# Patient Record
Sex: FEMALE | Race: WHITE | Employment: FULL TIME | URBAN - METROPOLITAN AREA
[De-identification: names, ages, dates, MRNs, and addresses within clinical notes are randomized per-mention and may not be internally consistent; named-entity substitution may affect disease eponyms.]

---

## 2024-03-13 ENCOUNTER — OFFICE VISIT (OUTPATIENT)
Dept: PAIN MANAGEMENT | Age: 39
End: 2024-03-13
Payer: COMMERCIAL

## 2024-03-13 ENCOUNTER — APPOINTMENT (OUTPATIENT)
Dept: GENERAL RADIOLOGY | Age: 39
End: 2024-03-13
Payer: COMMERCIAL

## 2024-03-13 ENCOUNTER — HOSPITAL ENCOUNTER (EMERGENCY)
Age: 39
Discharge: HOME | End: 2024-03-13
Payer: COMMERCIAL

## 2024-03-13 VITALS
OXYGEN SATURATION: 100 % | HEART RATE: 98 BPM | TEMPERATURE: 97.6 F | RESPIRATION RATE: 18 BRPM | SYSTOLIC BLOOD PRESSURE: 141 MMHG | DIASTOLIC BLOOD PRESSURE: 80 MMHG

## 2024-03-13 VITALS
HEART RATE: 102 BPM | TEMPERATURE: 97.3 F | OXYGEN SATURATION: 98 % | RESPIRATION RATE: 18 BRPM | SYSTOLIC BLOOD PRESSURE: 120 MMHG | HEIGHT: 65 IN | BODY MASS INDEX: 24.16 KG/M2 | DIASTOLIC BLOOD PRESSURE: 62 MMHG | WEIGHT: 145 LBS

## 2024-03-13 DIAGNOSIS — G89.4 CHRONIC PAIN SYNDROME: Primary | ICD-10-CM

## 2024-03-13 DIAGNOSIS — S12.500D: ICD-10-CM

## 2024-03-13 DIAGNOSIS — S46.911A STRAIN OF RIGHT SHOULDER, INITIAL ENCOUNTER: Primary | ICD-10-CM

## 2024-03-13 DIAGNOSIS — S92.101D CLOSED DISPLACED FRACTURE OF RIGHT TALUS WITH ROUTINE HEALING, UNSPECIFIED FRACTURE MORPHOLOGY, SUBSEQUENT ENCOUNTER: ICD-10-CM

## 2024-03-13 DIAGNOSIS — M79.18 MYOFASCIAL PAIN SYNDROME: ICD-10-CM

## 2024-03-13 DIAGNOSIS — S22.43XD MULTIPLE CLOSED FRACTURES OF RIBS OF BOTH SIDES WITH ROUTINE HEALING, SUBSEQUENT ENCOUNTER: ICD-10-CM

## 2024-03-13 DIAGNOSIS — G58.8 INTERCOSTAL NEURALGIA: ICD-10-CM

## 2024-03-13 PROCEDURE — 99213 OFFICE O/P EST LOW 20 MIN: CPT | Performed by: PAIN MEDICINE

## 2024-03-13 PROCEDURE — G8427 DOCREV CUR MEDS BY ELIG CLIN: HCPCS | Performed by: PAIN MEDICINE

## 2024-03-13 PROCEDURE — 73030 X-RAY EXAM OF SHOULDER: CPT

## 2024-03-13 PROCEDURE — G8420 CALC BMI NORM PARAMETERS: HCPCS | Performed by: PAIN MEDICINE

## 2024-03-13 PROCEDURE — 99283 EMERGENCY DEPT VISIT LOW MDM: CPT

## 2024-03-13 PROCEDURE — G8484 FLU IMMUNIZE NO ADMIN: HCPCS | Performed by: PAIN MEDICINE

## 2024-03-13 PROCEDURE — 4004F PT TOBACCO SCREEN RCVD TLK: CPT | Performed by: PAIN MEDICINE

## 2024-03-13 RX ORDER — PREDNISONE 10 MG/1
TABLET ORAL
Qty: 30 TABLET | Refills: 0 | Status: SHIPPED | OUTPATIENT
Start: 2024-03-13 | End: 2024-03-23

## 2024-03-13 RX ORDER — CYCLOBENZAPRINE HCL 5 MG
TABLET ORAL
Qty: 60 TABLET | Refills: 2 | Status: SHIPPED | OUTPATIENT
Start: 2024-03-13

## 2024-03-13 RX ORDER — IBUPROFEN 800 MG/1
TABLET ORAL
Qty: 60 TABLET | Refills: 2 | Status: SHIPPED | OUTPATIENT
Start: 2024-03-13

## 2024-03-13 RX ORDER — PREGABALIN 100 MG/1
100 CAPSULE ORAL 2 TIMES DAILY
Qty: 60 CAPSULE | Refills: 2 | Status: SHIPPED | OUTPATIENT
Start: 2024-03-13 | End: 2024-06-11

## 2024-03-13 ASSESSMENT — LIFESTYLE VARIABLES: HOW OFTEN DO YOU HAVE A DRINK CONTAINING ALCOHOL: NEVER

## 2024-03-13 NOTE — PROGRESS NOTES
[facility]  [address], [place] [address]  [phone]    Follow up Note      Angela Bustamante     Date of Visit:  3/13/2024    CC:  Patient presents for follow up   Chief Complaint   Patient presents with    Follow-up     Ultrasound guided intercostal nerve block at the T7 level Left     Pain     Right shoulder     HPI:  Follow up on her Left chest pain  Appropriate analgesia with current medications regimen: yes - .    Change in quality of symptoms:no.    Medication side effects:none.   Recent diagnostic testing:none.   Recent interventional procedures:Left T 6 and T 7 intercostal nerve block with excellent relief.    She has not been on anticoagulation medications to include none. The patient  has not been on herbal supplements.  The patient is not diabetic.     Imaging:   Reviwed     Previous treatments: Surgery Left clavicle ORIF and medications..         Potential Aberrant Drug-Related Behavior:    None    Urine Drug Screening:  None    OARRS report:  03/2024 consistent    Opioid Agreement:  Renewal date:N/A    Past Medical History:   Diagnosis Date    Low back pain     Smoker      Past Surgical History:   Procedure Laterality Date    ACROMIOPLASTY Left 10/13/2021    REMOVAL OF HARDWARE LEFT CLAVICLE performed by Ramiro Lu DO at [facility] [place]    CLAVICLE SURGERY Left 2/10/2021    RIGHT DISTAL CLAVICLE OPEN REDUCTION INTERNAL FIXATION performed by Ramiro Lu DO at [facility] [place]    THORACOSCOPY Left 2/14/2021    LEFT VIDEO ASSISTED THORACOSCOPY WITH TOTAL LUNG DECORTICATION AND PLEURODESIS. performed by Brittany Cantrell MD at [facility] [place]     Prior to Admission medications    Medication Sig Start Date End Date Taking? Authorizing Provider   ibuprofen (ADVIL;MOTRIN) 800 MG tablet TAKE 1 TABLET BY MOUTH three times DAILY AS NEEDED FOR PAIN 1/8/24  Yes Nic Corley MD   pregabalin (LYRICA) 100 MG capsule Take 1 capsule by mouth 2 times daily for 90 days.

## 2024-03-13 NOTE — ED PROVIDER NOTES
Independent MEGHNA Visit.        HPI:  3/13/24, Time: 12:06 PM EDT         Angela Dillard is a 38 y.o. female presenting to the ED for right shoulder pain, beginning this morning around 0700 while lifting a patient at work.  The complaint has been persistent, moderate in severity, and worsened by movement of right shoulder, particularly in abduction.  Patient reports that she and 3 other coworkers were lifting a 300 pound patient to get on the Becky pad to get back in bed from the floor when she was pulling she noticed a large pop and pain to the anterior aspect of the right shoulder.  Denies any numbness or tingling to the right upper extremity.  No neck pain.  Does report a history of a right clavicular fracture which did not require surgical intervention in the past however no other pathology to the right shoulder.  Afebrile without recent travel.  Patient denies other symptoms and injuries at this time.    Review of Systems:   A complete review of systems was performed and pertinent positives and negatives are stated within HPI, all other systems reviewed and are negative.          --------------------------------------------- PAST HISTORY ---------------------------------------------  Past Medical History:  has a past medical history of Low back pain and Smoker.    Past Surgical History:  has a past surgical history that includes Clavicle surgery (Left, 2/10/2021); Thoracoscopy (Left, 2/14/2021); and Acromioplasty (Left, 10/13/2021).    Social History:  reports that she has been smoking cigarettes. She has a 15.0 pack-year smoking history. She has never used smokeless tobacco. She reports that she does not drink alcohol and does not use drugs.    Family History: family history includes Cancer in her father.     The patient’s home medications have been reviewed.    Allergies: Lidocaine    -------------------------------------------------- RESULTS -------------------------------------------------  All laboratory

## 2024-03-13 NOTE — PROGRESS NOTES
Angela Bustamante presents to the [facility] on 3/13/2024. Angela is complaining of pain right shoulder. The pain is constant. The pain is described as aching, throbbing, stabbing, and penetrating. Pain is rated on her best day at a 7, on her worst day at a 8, and on average at a 7 on the VAS scale. She took her last dose of Lyrica, Flexeril, and 800 Ibuprofen  today.      Any procedures since your last visit: Yes, Ultrasound guided intercostal nerve block at the T7 level Left  with 80 % relief.    She is  on NSAIDS and  is not on anticoagulation medications to include none .     Pacemaker or defibrillator: No   Medication Contract and Consent for Opioid Use Documents Filed        No documents found                       Resp 18   Ht 1.651 m (5' 5\")   Wt 65.8 kg (145 lb)   BMI 24.13 kg/m²      No LMP recorded.

## 2024-06-10 ENCOUNTER — TELEPHONE (OUTPATIENT)
Dept: PAIN MANAGEMENT | Age: 39
End: 2024-06-10

## 2024-06-10 DIAGNOSIS — G58.8 INTERCOSTAL NEURALGIA: ICD-10-CM

## 2024-06-10 DIAGNOSIS — M79.18 MYOFASCIAL PAIN SYNDROME: ICD-10-CM

## 2024-06-10 RX ORDER — IBUPROFEN 800 MG/1
TABLET ORAL
Qty: 60 TABLET | Refills: 0 | Status: SHIPPED | OUTPATIENT
Start: 2024-06-10

## 2024-06-10 RX ORDER — CYCLOBENZAPRINE HCL 5 MG
TABLET ORAL
Qty: 60 TABLET | Refills: 0 | Status: SHIPPED | OUTPATIENT
Start: 2024-06-10

## 2024-06-10 RX ORDER — PREGABALIN 100 MG/1
100 CAPSULE ORAL 2 TIMES DAILY
Qty: 60 CAPSULE | Refills: 0 | Status: SHIPPED | OUTPATIENT
Start: 2024-06-10 | End: 2024-07-10

## 2024-06-10 NOTE — TELEPHONE ENCOUNTER
Angela is requesting we resend her Lyrica to [facility]. She got  since the last scripts were sent so her last name changed. She also got new insurance so the pharmacy will not accept the old scripts with her maiden name. thanks

## 2024-06-13 ENCOUNTER — OFFICE VISIT (OUTPATIENT)
Dept: PAIN MANAGEMENT | Age: 39
End: 2024-06-13
Payer: COMMERCIAL

## 2024-06-13 VITALS
BODY MASS INDEX: 24.16 KG/M2 | TEMPERATURE: 97.1 F | SYSTOLIC BLOOD PRESSURE: 110 MMHG | HEART RATE: 95 BPM | WEIGHT: 145 LBS | DIASTOLIC BLOOD PRESSURE: 78 MMHG | HEIGHT: 65 IN | OXYGEN SATURATION: 95 % | RESPIRATION RATE: 18 BRPM

## 2024-06-13 DIAGNOSIS — M25.511 CHRONIC RIGHT SHOULDER PAIN: ICD-10-CM

## 2024-06-13 DIAGNOSIS — G89.29 CHRONIC RIGHT SHOULDER PAIN: ICD-10-CM

## 2024-06-13 DIAGNOSIS — S12.500D: ICD-10-CM

## 2024-06-13 DIAGNOSIS — S22.21XD CLOSED FRACTURE OF MANUBRIUM WITH ROUTINE HEALING, SUBSEQUENT ENCOUNTER: ICD-10-CM

## 2024-06-13 DIAGNOSIS — G89.29 CHRONIC RIGHT SHOULDER PAIN: Primary | ICD-10-CM

## 2024-06-13 DIAGNOSIS — G58.8 INTERCOSTAL NEURALGIA: ICD-10-CM

## 2024-06-13 DIAGNOSIS — M79.18 MYOFASCIAL PAIN SYNDROME: ICD-10-CM

## 2024-06-13 DIAGNOSIS — S22.43XD MULTIPLE CLOSED FRACTURES OF RIBS OF BOTH SIDES WITH ROUTINE HEALING, SUBSEQUENT ENCOUNTER: ICD-10-CM

## 2024-06-13 DIAGNOSIS — S92.101D CLOSED DISPLACED FRACTURE OF RIGHT TALUS WITH ROUTINE HEALING, UNSPECIFIED FRACTURE MORPHOLOGY, SUBSEQUENT ENCOUNTER: ICD-10-CM

## 2024-06-13 DIAGNOSIS — G89.4 CHRONIC PAIN SYNDROME: Primary | ICD-10-CM

## 2024-06-13 DIAGNOSIS — T07.XXXA MULTIPLE TRAUMA: ICD-10-CM

## 2024-06-13 DIAGNOSIS — M25.511 CHRONIC RIGHT SHOULDER PAIN: Primary | ICD-10-CM

## 2024-06-13 PROCEDURE — G8427 DOCREV CUR MEDS BY ELIG CLIN: HCPCS | Performed by: PAIN MEDICINE

## 2024-06-13 PROCEDURE — 20610 DRAIN/INJ JOINT/BURSA W/O US: CPT | Performed by: PAIN MEDICINE

## 2024-06-13 PROCEDURE — 99214 OFFICE O/P EST MOD 30 MIN: CPT | Performed by: PAIN MEDICINE

## 2024-06-13 PROCEDURE — 4004F PT TOBACCO SCREEN RCVD TLK: CPT | Performed by: PAIN MEDICINE

## 2024-06-13 PROCEDURE — 99213 OFFICE O/P EST LOW 20 MIN: CPT | Performed by: PAIN MEDICINE

## 2024-06-13 PROCEDURE — G8420 CALC BMI NORM PARAMETERS: HCPCS | Performed by: PAIN MEDICINE

## 2024-06-13 RX ORDER — BUPIVACAINE HYDROCHLORIDE 2.5 MG/ML
3 INJECTION, SOLUTION INFILTRATION; PERINEURAL ONCE
Status: COMPLETED | OUTPATIENT
Start: 2024-06-13 | End: 2024-06-13

## 2024-06-13 RX ORDER — METHYLPREDNISOLONE ACETATE 40 MG/ML
40 INJECTION, SUSPENSION INTRA-ARTICULAR; INTRALESIONAL; INTRAMUSCULAR; SOFT TISSUE ONCE
Status: COMPLETED | OUTPATIENT
Start: 2024-06-13 | End: 2024-06-13

## 2024-06-13 RX ADMIN — BUPIVACAINE HYDROCHLORIDE 7.5 MG: 2.5 INJECTION, SOLUTION INFILTRATION; PERINEURAL at 15:59

## 2024-06-13 RX ADMIN — METHYLPREDNISOLONE ACETATE 40 MG: 40 INJECTION, SUSPENSION INTRA-ARTICULAR; INTRALESIONAL; INTRAMUSCULAR; SOFT TISSUE at 16:00

## 2024-06-13 NOTE — PROGRESS NOTES
Angela Dillard presents to the [facility] on 6/13/2024. Angela is complaining of pain in her left chest and right shoulder. The pain is constant. The pain is described as aching, throbbing, and stabbing. Pain is rated on her best day at a 3, on her worst day at a 8, and on average at a 6 on the VAS scale. She took her last dose of Lyrica, Motrin, and Flexeril Today.      Any procedures since your last visit: No    She is  on NSAIDS and  is not on anticoagulation medications to include none and is managed by NA.     Pacemaker or defibrillator: No     Medication Contract and Consent for Opioid Use Documents Filed        No documents found                       Resp 18   Ht 1.651 m (5' 5\")   Wt 65.8 kg (145 lb)   BMI 24.13 kg/m²      No LMP recorded.  
6/13/2024    I was present for the physical exam and/or procedure of Angela Dillard by Nic Corley M.D.  
Shoulder steroid injection. After discussing the potential risks and benefits of the procedure with the patient.  Angela did request that we proceed. A complete History & Physical was reviewed and it is unchanged.    DESCRIPTION OF PROCEDURE:   The patient was placed in a seated position. The area of the Right  shoulder was prepped with chloraprep and draped in a sterile manner. The overlying skin and subcutaneous tissues were anesthetized with 0.5% Lidocaine. Using the posterior approach, a 25 gauge 1 1/2 inch  needle was advanced  In anterolateral projection into the joint capsule. After negative aspiration a solution of 0.25 % marcaine 3 cc and 40 mg DepoMedrol was injected easily without complications. The needle was then removed and Band-Aid applied.    Disposition the patient tolerated the procedure well and there were no complications .     Angela will follow up in our comprehensive [facility] as scheduled. She was encouraged to call with questions, concerns or if worsening of symptoms occurs.    MD Fe CLINTON M.D.

## 2024-07-15 ENCOUNTER — TELEPHONE (OUTPATIENT)
Dept: PAIN MANAGEMENT | Age: 39
End: 2024-07-15

## 2024-07-15 DIAGNOSIS — M79.18 MYOFASCIAL PAIN SYNDROME: ICD-10-CM

## 2024-07-15 DIAGNOSIS — G58.8 INTERCOSTAL NEURALGIA: ICD-10-CM

## 2024-07-15 RX ORDER — PREGABALIN 100 MG/1
100 CAPSULE ORAL 2 TIMES DAILY
Qty: 60 CAPSULE | Refills: 0 | Status: SHIPPED | OUTPATIENT
Start: 2024-07-15 | End: 2024-08-14

## 2024-07-15 RX ORDER — CYCLOBENZAPRINE HCL 5 MG
TABLET ORAL
Qty: 60 TABLET | Refills: 0 | Status: SHIPPED | OUTPATIENT
Start: 2024-07-15

## 2024-07-15 RX ORDER — IBUPROFEN 800 MG/1
TABLET ORAL
Qty: 60 TABLET | Refills: 0 | Status: SHIPPED | OUTPATIENT
Start: 2024-07-15

## 2024-08-16 ENCOUNTER — TELEPHONE (OUTPATIENT)
Dept: PAIN MANAGEMENT | Age: 39
End: 2024-08-16

## 2024-08-16 DIAGNOSIS — M79.18 MYOFASCIAL PAIN SYNDROME: ICD-10-CM

## 2024-08-16 DIAGNOSIS — G58.8 INTERCOSTAL NEURALGIA: ICD-10-CM

## 2024-08-16 RX ORDER — CYCLOBENZAPRINE HCL 5 MG
TABLET ORAL
Qty: 60 TABLET | Refills: 0 | Status: SHIPPED | OUTPATIENT
Start: 2024-08-16

## 2024-08-16 RX ORDER — PREGABALIN 100 MG/1
100 CAPSULE ORAL 2 TIMES DAILY
Qty: 60 CAPSULE | Refills: 0 | Status: SHIPPED | OUTPATIENT
Start: 2024-08-16 | End: 2024-09-15

## 2024-08-16 RX ORDER — IBUPROFEN 800 MG/1
TABLET ORAL
Qty: 60 TABLET | Refills: 0 | Status: SHIPPED | OUTPATIENT
Start: 2024-08-16

## 2024-09-13 ENCOUNTER — TELEPHONE (OUTPATIENT)
Dept: PAIN MANAGEMENT | Age: 39
End: 2024-09-13

## 2024-09-13 DIAGNOSIS — G58.8 INTERCOSTAL NEURALGIA: ICD-10-CM

## 2024-09-13 DIAGNOSIS — M79.18 MYOFASCIAL PAIN SYNDROME: ICD-10-CM

## 2024-09-13 RX ORDER — IBUPROFEN 800 MG/1
TABLET, FILM COATED ORAL
Qty: 60 TABLET | Refills: 0 | Status: SHIPPED | OUTPATIENT
Start: 2024-09-13

## 2024-09-13 RX ORDER — PREGABALIN 100 MG/1
100 CAPSULE ORAL 2 TIMES DAILY
Qty: 60 CAPSULE | Refills: 0 | Status: SHIPPED | OUTPATIENT
Start: 2024-09-13 | End: 2024-10-13

## 2024-09-13 RX ORDER — CYCLOBENZAPRINE HCL 5 MG
TABLET ORAL
Qty: 60 TABLET | Refills: 0 | Status: SHIPPED | OUTPATIENT
Start: 2024-09-13

## 2024-09-25 ENCOUNTER — OFFICE VISIT (OUTPATIENT)
Dept: PAIN MANAGEMENT | Age: 39
End: 2024-09-25
Payer: COMMERCIAL

## 2024-09-25 VITALS
OXYGEN SATURATION: 98 % | HEART RATE: 82 BPM | HEIGHT: 65 IN | BODY MASS INDEX: 24.16 KG/M2 | WEIGHT: 145 LBS | RESPIRATION RATE: 18 BRPM | DIASTOLIC BLOOD PRESSURE: 80 MMHG | SYSTOLIC BLOOD PRESSURE: 122 MMHG

## 2024-09-25 DIAGNOSIS — S22.43XD MULTIPLE CLOSED FRACTURES OF RIBS OF BOTH SIDES WITH ROUTINE HEALING, SUBSEQUENT ENCOUNTER: ICD-10-CM

## 2024-09-25 DIAGNOSIS — G58.8 INTERCOSTAL NEURALGIA: ICD-10-CM

## 2024-09-25 DIAGNOSIS — S12.500D: Primary | ICD-10-CM

## 2024-09-25 DIAGNOSIS — G89.4 CHRONIC PAIN SYNDROME: ICD-10-CM

## 2024-09-25 DIAGNOSIS — T07.XXXA MULTIPLE TRAUMA: ICD-10-CM

## 2024-09-25 DIAGNOSIS — S92.101D CLOSED DISPLACED FRACTURE OF RIGHT TALUS WITH ROUTINE HEALING, UNSPECIFIED FRACTURE MORPHOLOGY, SUBSEQUENT ENCOUNTER: ICD-10-CM

## 2024-09-25 DIAGNOSIS — G89.29 CHRONIC RIGHT SHOULDER PAIN: ICD-10-CM

## 2024-09-25 DIAGNOSIS — M79.18 MYOFASCIAL PAIN SYNDROME: ICD-10-CM

## 2024-09-25 DIAGNOSIS — S22.21XD CLOSED FRACTURE OF MANUBRIUM WITH ROUTINE HEALING, SUBSEQUENT ENCOUNTER: ICD-10-CM

## 2024-09-25 DIAGNOSIS — M25.511 CHRONIC RIGHT SHOULDER PAIN: ICD-10-CM

## 2024-09-25 PROCEDURE — 99213 OFFICE O/P EST LOW 20 MIN: CPT | Performed by: PAIN MEDICINE

## 2024-09-25 PROCEDURE — G8420 CALC BMI NORM PARAMETERS: HCPCS | Performed by: PAIN MEDICINE

## 2024-09-25 PROCEDURE — 4004F PT TOBACCO SCREEN RCVD TLK: CPT | Performed by: PAIN MEDICINE

## 2024-09-25 PROCEDURE — G8427 DOCREV CUR MEDS BY ELIG CLIN: HCPCS | Performed by: PAIN MEDICINE

## 2024-09-25 RX ORDER — HYDROCODONE BITARTRATE AND ACETAMINOPHEN 5; 325 MG/1; MG/1
1 TABLET ORAL EVERY 8 HOURS PRN
COMMUNITY
Start: 2024-08-29

## 2024-09-25 RX ORDER — PREGABALIN 100 MG/1
100 CAPSULE ORAL 2 TIMES DAILY
Qty: 60 CAPSULE | Refills: 2 | Status: SHIPPED | OUTPATIENT
Start: 2024-09-25 | End: 2024-12-24

## 2024-09-25 RX ORDER — CYCLOBENZAPRINE HCL 5 MG
TABLET ORAL
Qty: 60 TABLET | Refills: 2 | Status: SHIPPED | OUTPATIENT
Start: 2024-09-25

## 2024-09-25 RX ORDER — IBUPROFEN 800 MG/1
TABLET, FILM COATED ORAL
Qty: 60 TABLET | Refills: 2 | Status: SHIPPED | OUTPATIENT
Start: 2024-09-25

## 2024-12-17 ENCOUNTER — OFFICE VISIT (OUTPATIENT)
Dept: PAIN MANAGEMENT | Age: 39
End: 2024-12-17
Payer: COMMERCIAL

## 2024-12-17 VITALS
HEIGHT: 65 IN | TEMPERATURE: 96.9 F | OXYGEN SATURATION: 97 % | WEIGHT: 145 LBS | RESPIRATION RATE: 18 BRPM | DIASTOLIC BLOOD PRESSURE: 76 MMHG | BODY MASS INDEX: 24.16 KG/M2 | SYSTOLIC BLOOD PRESSURE: 118 MMHG | HEART RATE: 86 BPM

## 2024-12-17 DIAGNOSIS — S22.21XD CLOSED FRACTURE OF MANUBRIUM WITH ROUTINE HEALING, SUBSEQUENT ENCOUNTER: ICD-10-CM

## 2024-12-17 DIAGNOSIS — G89.4 CHRONIC PAIN SYNDROME: ICD-10-CM

## 2024-12-17 DIAGNOSIS — G89.29 CHRONIC RIGHT SHOULDER PAIN: ICD-10-CM

## 2024-12-17 DIAGNOSIS — M79.18 MYOFASCIAL PAIN SYNDROME: ICD-10-CM

## 2024-12-17 DIAGNOSIS — S12.500D: ICD-10-CM

## 2024-12-17 DIAGNOSIS — G58.8 INTERCOSTAL NEURALGIA: Primary | ICD-10-CM

## 2024-12-17 DIAGNOSIS — M25.511 CHRONIC RIGHT SHOULDER PAIN: ICD-10-CM

## 2024-12-17 DIAGNOSIS — S92.101D CLOSED DISPLACED FRACTURE OF RIGHT TALUS WITH ROUTINE HEALING, UNSPECIFIED FRACTURE MORPHOLOGY, SUBSEQUENT ENCOUNTER: ICD-10-CM

## 2024-12-17 DIAGNOSIS — S22.43XD MULTIPLE CLOSED FRACTURES OF RIBS OF BOTH SIDES WITH ROUTINE HEALING, SUBSEQUENT ENCOUNTER: ICD-10-CM

## 2024-12-17 PROCEDURE — G8484 FLU IMMUNIZE NO ADMIN: HCPCS | Performed by: PAIN MEDICINE

## 2024-12-17 PROCEDURE — 4004F PT TOBACCO SCREEN RCVD TLK: CPT | Performed by: PAIN MEDICINE

## 2024-12-17 PROCEDURE — G8420 CALC BMI NORM PARAMETERS: HCPCS | Performed by: PAIN MEDICINE

## 2024-12-17 PROCEDURE — G8427 DOCREV CUR MEDS BY ELIG CLIN: HCPCS | Performed by: PAIN MEDICINE

## 2024-12-17 PROCEDURE — 99213 OFFICE O/P EST LOW 20 MIN: CPT | Performed by: PAIN MEDICINE

## 2024-12-17 RX ORDER — PREGABALIN 100 MG/1
100 CAPSULE ORAL 2 TIMES DAILY
Qty: 60 CAPSULE | Refills: 2 | Status: SHIPPED | OUTPATIENT
Start: 2024-12-24 | End: 2025-03-24

## 2024-12-17 RX ORDER — CYCLOBENZAPRINE HCL 5 MG
TABLET ORAL
Qty: 60 TABLET | Refills: 2 | Status: SHIPPED | OUTPATIENT
Start: 2024-12-24

## 2024-12-17 RX ORDER — IBUPROFEN 800 MG/1
TABLET, FILM COATED ORAL
Qty: 60 TABLET | Refills: 2 | Status: SHIPPED | OUTPATIENT
Start: 2024-12-24

## 2024-12-17 NOTE — PROGRESS NOTES
[facility]  [address], [place] [address]  [phone]    Follow up Note      Angela Dillard     Date of Visit:  12/17/2024    CC:  Patient presents for follow up   Chief Complaint   Patient presents with    Shoulder Pain     HPI:  Follow up on her Left chest right shoulder pain with no acute issues.  Appropriate analgesia with current medications regimen: yes - .    Change in quality of symptoms:no.    Medication side effects:none.   Recent diagnostic testing:none  Recent interventional procedures:none    She has not been on anticoagulation medications to include none. The patient  has not been on herbal supplements.  The patient is not diabetic.     Imaging:   Right shoulder MRI   1.  Increased intrasubstance signal in the posterior supraspinatus and   anterior infraspinatus tendons centered at the conjoined region which may   be seen with mild tendinosis or tendon strain in the correct clinical   setting.  No high-grade partial or full-thickness rotator cuff tears.   2.  Superior labral tear.   3.  Lateral downward sloping acromion with mild acromioclavicular   osteoarthrosis with mild bone marrow edema like signal probably reactive   however can be seen with mild stress response.     Previous treatments: Surgery Left clavicle ORIF and medications..         Potential Aberrant Drug-Related Behavior:    None    Urine Drug Screening:  None    OARRS report:  09/2024 consistent with her right shoulder surgery.  12/2024 consistent     Opioid Agreement:  Renewal date:N/A    Past Medical History:   Diagnosis Date    Low back pain     Smoker      Past Surgical History:   Procedure Laterality Date    ACROMIOPLASTY Left 10/13/2021    REMOVAL OF HARDWARE LEFT CLAVICLE performed by Ramiro Lu DO at [facility] [place]    CLAVICLE SURGERY Left 2/10/2021    RIGHT DISTAL CLAVICLE OPEN REDUCTION INTERNAL FIXATION performed by Ramiro Lu DO at [facility] [place]    THORACOSCOPY

## 2024-12-17 NOTE — PROGRESS NOTES
Angela Dillard presents to the [facility] on 12/17/2024. Angela is complaining of pain left chest right shoulder. The pain is constant. The pain is described as aching, stiff, stabbing and sharp. Pain is rated on her best day at a 2, on her worst day at a 7, and on average at a 4 on the VAS scale. She took her last dose of Lyrica, Motrin, and Flexeril today.  Pt is currently in physical therapy.    Any procedures since your last visit: No    She is  on NSAIDS and  is not on anticoagulation medications to include none and is managed by NA.     Pacemaker or defibrillator: No     Medication Contract and Consent for Opioid Use Documents Filed        No documents found                       There were no vitals taken for this visit.     No LMP recorded.

## 2025-03-20 ENCOUNTER — OFFICE VISIT (OUTPATIENT)
Dept: PAIN MANAGEMENT | Age: 40
End: 2025-03-20
Payer: COMMERCIAL

## 2025-03-20 VITALS
DIASTOLIC BLOOD PRESSURE: 74 MMHG | SYSTOLIC BLOOD PRESSURE: 111 MMHG | WEIGHT: 145 LBS | HEART RATE: 97 BPM | BODY MASS INDEX: 24.16 KG/M2 | OXYGEN SATURATION: 95 % | HEIGHT: 65 IN | TEMPERATURE: 96.9 F | RESPIRATION RATE: 18 BRPM

## 2025-03-20 DIAGNOSIS — M79.18 MYOFASCIAL PAIN SYNDROME: ICD-10-CM

## 2025-03-20 PROCEDURE — 99213 OFFICE O/P EST LOW 20 MIN: CPT | Performed by: PAIN MEDICINE

## 2025-03-20 RX ORDER — CYCLOBENZAPRINE HCL 5 MG
TABLET ORAL
Qty: 60 TABLET | Refills: 2 | Status: SHIPPED | OUTPATIENT
Start: 2025-03-20

## 2025-03-20 RX ORDER — IBUPROFEN 800 MG/1
TABLET, FILM COATED ORAL
Qty: 60 TABLET | Refills: 2 | Status: SHIPPED | OUTPATIENT
Start: 2025-03-20

## 2025-03-20 NOTE — PROGRESS NOTES
Angela Dillard presents to the [facility] on 3/20/2025. Angela is complaining of pain left flank and left shoulder. The pain is constant. The pain is described as stinging, pins and needles. Pain is rated on her best day at a 1, on her worst day at a 5, and on average at a 3 on the VAS scale. She took her last dose of Lyrica, Flexeril, and 800mg Ibuprofen  today.      Any procedures since your last visit: No  She is  on NSAIDS and  is not on anticoagulation medications   Pacemaker or defibrillator: No   Do you want someone present when the provider examines you? No    Medication Contract and Consent for Opioid Use Documents Filed        No documents found                       Resp 18   Ht 1.651 m (5' 5\")   Wt 65.8 kg (145 lb)   BMI 24.13 kg/m²      No LMP recorded.    
                                                                                                          Angela Dillard presents to the [facility] on 3/20/2025. Angela is complaining of pain in her left chest and right shoulder. The pain is constant. The pain is described as aching, stabbing, sharp, and stiff. Pain is rated on her best day at a 2, on her worst day at a 7, and on average at a 4 on the VAS scale. She took her last dose of Lyrica, Motrin, and Flexeril Today.      Any procedures since your last visit: No    She is  on NSAIDS and  is not on anticoagulation medications to include none and is managed by NA.     Pacemaker or defibrillator: No    Medication Contract and Consent for Opioid Use Documents Filed        No documents found                       Resp 18   Ht 1.651 m (5' 5\")   Wt 65.8 kg (145 lb)   BMI 24.13 kg/m²      No LMP recorded.  
DO Tabitha at [facility]    THORACOSCOPY Left 2/14/2021    LEFT VIDEO ASSISTED THORACOSCOPY WITH TOTAL LUNG DECORTICATION AND PLEURODESIS. performed by Brittany Cantrell MD at [facility]     Prior to Admission medications    Medication Sig Start Date End Date Taking? Authorizing Provider   pregabalin (LYRICA) 100 MG capsule Take 1 capsule by mouth 2 times daily for 90 days. Max Daily Amount: 200 mg 12/24/24 3/24/25 Yes Nic Corley MD   ibuprofen (ADVIL;MOTRIN) 800 MG tablet TAKE 1 TABLET BY MOUTH three times DAILY AS NEEDED FOR PAIN 12/24/24  Yes Nic Corley MD   cyclobenzaprine (FLEXERIL) 5 MG tablet TAKE 1 TABLET BY MOUTH TWICE DAILY AS NEEDED FOR MUSCLE SPASMS 12/24/24  Yes Nic Corley MD   HYDROcodone-acetaminophen (NORCO) 5-325 MG per tablet Take 1 tablet by mouth every 8 hours as needed for Pain.  Patient not taking: Reported on 3/20/2025 8/29/24   Provider, MD Sary     Allergies   Allergen Reactions    Lidocaine      ztlido patch, red and itchy at site of patch     Social History     Socioeconomic History    Marital status: Single     Spouse name: Not on file    Number of children: Not on file    Years of education: Not on file    Highest education level: Not on file   Occupational History    Not on file   Tobacco Use    Smoking status: Every Day     Current packs/day: 1.00     Average packs/day: 1 pack/day for 15.0 years (15.0 ttl pk-yrs)     Types: Cigarettes    Smokeless tobacco: Never   Vaping Use    Vaping status: Never Used   Substance and Sexual Activity    Alcohol use: No     Comment: rarely    Drug use: No    Sexual activity: Yes     Partners: Male   Other Topics Concern    Not on file   Social History Narrative    ** Merged History Encounter **          Social Drivers of Health     Financial Resource Strain: Not on file   Food Insecurity: Not on file   Transportation Needs: Not on file   Physical Activity: Not on file   Stress: Not on file   Social Connections: Not on file   Intimate

## 2025-04-17 ENCOUNTER — TELEPHONE (OUTPATIENT)
Dept: PAIN MANAGEMENT | Age: 40
End: 2025-04-17

## 2025-04-17 DIAGNOSIS — G58.8 INTERCOSTAL NEURALGIA: ICD-10-CM

## 2025-04-17 RX ORDER — PREGABALIN 100 MG/1
100 CAPSULE ORAL 2 TIMES DAILY
Qty: 60 CAPSULE | Refills: 2 | Status: SHIPPED | OUTPATIENT
Start: 2025-04-17 | End: 2025-07-16

## 2025-04-17 NOTE — TELEPHONE ENCOUNTER
Angela is requesting a refill of Lyrica. Last filled 3/12. OARRS is consistent. Last appt 3/20. Next appt 6/2. Thanks.

## 2025-07-22 ENCOUNTER — OFFICE VISIT (OUTPATIENT)
Age: 40
End: 2025-07-22
Payer: COMMERCIAL

## 2025-07-22 VITALS
WEIGHT: 145 LBS | HEART RATE: 90 BPM | BODY MASS INDEX: 24.16 KG/M2 | SYSTOLIC BLOOD PRESSURE: 122 MMHG | OXYGEN SATURATION: 96 % | RESPIRATION RATE: 16 BRPM | DIASTOLIC BLOOD PRESSURE: 87 MMHG | HEIGHT: 65 IN | TEMPERATURE: 97.1 F

## 2025-07-22 DIAGNOSIS — S12.500D: ICD-10-CM

## 2025-07-22 DIAGNOSIS — G89.29 CHRONIC RIGHT SHOULDER PAIN: ICD-10-CM

## 2025-07-22 DIAGNOSIS — G89.4 CHRONIC PAIN SYNDROME: ICD-10-CM

## 2025-07-22 DIAGNOSIS — S22.43XD MULTIPLE CLOSED FRACTURES OF RIBS OF BOTH SIDES WITH ROUTINE HEALING, SUBSEQUENT ENCOUNTER: ICD-10-CM

## 2025-07-22 DIAGNOSIS — S92.101D CLOSED DISPLACED FRACTURE OF RIGHT TALUS WITH ROUTINE HEALING, UNSPECIFIED FRACTURE MORPHOLOGY, SUBSEQUENT ENCOUNTER: ICD-10-CM

## 2025-07-22 DIAGNOSIS — S22.21XD CLOSED FRACTURE OF MANUBRIUM WITH ROUTINE HEALING, SUBSEQUENT ENCOUNTER: ICD-10-CM

## 2025-07-22 DIAGNOSIS — M25.511 CHRONIC RIGHT SHOULDER PAIN: ICD-10-CM

## 2025-07-22 DIAGNOSIS — M79.18 MYOFASCIAL PAIN SYNDROME: Primary | ICD-10-CM

## 2025-07-22 DIAGNOSIS — G58.8 INTERCOSTAL NEURALGIA: ICD-10-CM

## 2025-07-22 PROCEDURE — 99213 OFFICE O/P EST LOW 20 MIN: CPT | Performed by: PAIN MEDICINE

## 2025-07-22 RX ORDER — PREGABALIN 100 MG/1
100 CAPSULE ORAL 2 TIMES DAILY
Qty: 60 CAPSULE | Refills: 5 | Status: SHIPPED | OUTPATIENT
Start: 2025-07-22 | End: 2026-01-18

## 2025-07-22 RX ORDER — CYCLOBENZAPRINE HCL 5 MG
TABLET ORAL
Qty: 60 TABLET | Refills: 5 | Status: SHIPPED | OUTPATIENT
Start: 2025-07-22

## 2025-07-22 RX ORDER — IBUPROFEN 800 MG/1
TABLET, FILM COATED ORAL
Qty: 60 TABLET | Refills: 5 | Status: SHIPPED | OUTPATIENT
Start: 2025-07-22

## 2025-07-22 NOTE — PROGRESS NOTES
Angela Dillard presents to the [facility] on 7/22/2025. Angela is complaining of neuralgia. The pain is constant. The pain is described as pins and needles. Pain is rated on her best day at a 1, on her worst day at a 4, and on average at a 3 on the VAS scale. She took her last dose of Lyrica, Motrin, and Flexeril this morning.      Any procedures since your last visit: No    She is  on NSAIDS and  is not on anticoagulation medications    Pacemaker or defibrillator: No     Do you want someone present when the provider examines you? No    Medication Contract and Consent for Opioid Use Documents Filed        No documents found                       /87   Pulse 90   Temp 97.1 °F (36.2 °C) (Infrared)   Resp 16   Ht 1.651 m (5' 5\")   Wt 65.8 kg (145 lb)   SpO2 96%   BMI 24.13 kg/m²      No LMP recorded.

## 2025-07-22 NOTE — PROGRESS NOTES
[facility]  [address] [place] [address]  [phone]    Follow up Note      Angela Dillard     Date of Visit:  7/22/2025    CC:  Patient presents for follow up   Chief Complaint   Patient presents with    Follow-up     Neuralgia     HPI:  Follow up on her Left chest right shoulder pain with no acute issues.  Appropriate analgesia with current medications regimen: yes - .    Change in quality of symptoms:no.    Medication side effects:none.   Recent diagnostic testing:none  Recent interventional procedures:none    She has not been on anticoagulation medications to include none. The patient  has not been on herbal supplements.  The patient is not diabetic.     Imaging:   Right shoulder MRI   1.  Increased intrasubstance signal in the posterior supraspinatus and   anterior infraspinatus tendons centered at the conjoined region which may   be seen with mild tendinosis or tendon strain in the correct clinical   setting.  No high-grade partial or full-thickness rotator cuff tears.   2.  Superior labral tear.   3.  Lateral downward sloping acromion with mild acromioclavicular   osteoarthrosis with mild bone marrow edema like signal probably reactive   however can be seen with mild stress response.     Previous treatments: Surgery Left clavicle ORIF and medications..         Potential Aberrant Drug-Related Behavior:    None    Urine Drug Screening:  None    OARRS report:  09/2024 consistent with her right shoulder surgery.  07/2025 consistent     Opioid Agreement:  Renewal date:N/A    Past Medical History:   Diagnosis Date    Low back pain     Smoker      Past Surgical History:   Procedure Laterality Date    ACROMIOPLASTY Left 10/13/2021    REMOVAL OF HARDWARE LEFT CLAVICLE performed by Ramiro Lu DO at [facility]    CLAVICLE SURGERY Left 2/10/2021    RIGHT DISTAL CLAVICLE OPEN REDUCTION INTERNAL FIXATION performed by Ramiro Lu DO at [facility]